# Patient Record
Sex: MALE | Race: OTHER | URBAN - METROPOLITAN AREA
[De-identification: names, ages, dates, MRNs, and addresses within clinical notes are randomized per-mention and may not be internally consistent; named-entity substitution may affect disease eponyms.]

---
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
## 2023-03-03 NOTE — ED PEDIATRIC NURSE NOTE - NS ED NURSE DISCH DISPOSITION
Brow Lift Text: A midfrontal incision was made medially to the defect to allow access to the tissues just superior to the left eyebrow. Following careful dissection inferiorly in a supraperiosteal plane to the level of the left eyebrow, several 3-0 monocryl sutures were used to resuspend the eyebrow orbicularis oculi muscular unit to the superior frontal bone periosteum. This resulted in an appropriate reapproximation of static eyebrow symmetry and correction of the left brow ptosis. Discharged

## 2023-03-27 ENCOUNTER — EMERGENCY (EMERGENCY)
Facility: HOSPITAL | Age: 18
LOS: 1 days | Discharge: HOME | End: 2023-03-27
Payer: MEDICAID

## 2023-03-27 DIAGNOSIS — J02.9 ACUTE PHARYNGITIS, UNSPECIFIED: ICD-10-CM

## 2023-03-27 DIAGNOSIS — R07.89 OTHER CHEST PAIN: ICD-10-CM

## 2023-03-27 PROCEDURE — 71046 X-RAY EXAM CHEST 2 VIEWS: CPT | Mod: 26

## 2023-03-27 PROCEDURE — 99283 EMERGENCY DEPT VISIT LOW MDM: CPT | Mod: 25

## 2023-03-27 PROCEDURE — 93010 ELECTROCARDIOGRAM REPORT: CPT

## 2023-03-27 PROCEDURE — 99284 EMERGENCY DEPT VISIT MOD MDM: CPT

## 2023-03-27 PROCEDURE — 93005 ELECTROCARDIOGRAM TRACING: CPT

## 2023-03-27 PROCEDURE — 71046 X-RAY EXAM CHEST 2 VIEWS: CPT

## 2023-08-16 ENCOUNTER — EMERGENCY (EMERGENCY)
Facility: HOSPITAL | Age: 18
LOS: 0 days | Discharge: HOME | End: 2023-08-17
Attending: EMERGENCY MEDICINE
Payer: MEDICAID

## 2023-08-16 VITALS
HEART RATE: 67 BPM | RESPIRATION RATE: 18 BRPM | WEIGHT: 119.93 LBS | DIASTOLIC BLOOD PRESSURE: 79 MMHG | HEIGHT: 68 IN | TEMPERATURE: 98 F | OXYGEN SATURATION: 99 % | SYSTOLIC BLOOD PRESSURE: 112 MMHG

## 2023-08-16 DIAGNOSIS — R20.8 OTHER DISTURBANCES OF SKIN SENSATION: ICD-10-CM

## 2023-08-16 DIAGNOSIS — L29.9 PRURITUS, UNSPECIFIED: ICD-10-CM

## 2023-08-16 DIAGNOSIS — R23.8 OTHER SKIN CHANGES: ICD-10-CM

## 2023-08-16 PROCEDURE — 82962 GLUCOSE BLOOD TEST: CPT

## 2023-08-16 PROCEDURE — 99282 EMERGENCY DEPT VISIT SF MDM: CPT

## 2023-08-16 PROCEDURE — 99284 EMERGENCY DEPT VISIT MOD MDM: CPT | Mod: 25

## 2023-08-16 NOTE — ED ADULT TRIAGE NOTE - CHIEF COMPLAINT QUOTE
Pt ambulatory to ED c/o "irritated skin". Pt states he has dry eyes, dry throat. Pt denies itching or burning skin but says "irritated like allergies". No visible rash noted. Pt in no respiratory distress. Pt took zyrtec earlier today. Pt AO4 and denies PMH and has NKDA.

## 2023-08-17 LAB — GLUCOSE BLDC GLUCOMTR-MCNC: 94 MG/DL — SIGNIFICANT CHANGE UP (ref 70–99)

## 2023-08-17 RX ORDER — HYDROCORTISONE 1 %
1 OINTMENT (GRAM) TOPICAL
Qty: 3.5 | Refills: 0
Start: 2023-08-17 | End: 2023-08-23

## 2023-08-17 NOTE — ED STATDOCS - NSFOLLOWUPINSTRUCTIONS_ED_ALL_ED_FT
Hydrocortisone sent to pharmacy, use as directed.    Can continue over the counter antihistamine if you have itching.    Follow up with your PCP.    Return to the ED for any worsening.

## 2023-08-17 NOTE — ED STATDOCS - PATIENT PORTAL LINK FT
You can access the FollowMyHealth Patient Portal offered by [facility] by registering at the following website: [email or web address]. By joining [email or web address]’s FollowMyHealth portal, you will also be able to view your health information using other applications (apps) compatible with our system.

## 2023-08-17 NOTE — ED STATDOCS - OBJECTIVE STATEMENT
16 yo M no significant PMHx presents with CC of irritated skin.  States symptoms started this morning.  States skin on arms, neck, upper back feel itchy, dry.  States his mouth and eyes also feel dry. He took allergy medication this morning without improvement.  No other concerns.

## 2024-03-04 ENCOUNTER — APPOINTMENT (OUTPATIENT)
Dept: OPHTHALMOLOGY | Facility: CLINIC | Age: 19
End: 2024-03-04

## 2024-03-29 ENCOUNTER — APPOINTMENT (OUTPATIENT)
Dept: OPHTHALMOLOGY | Facility: CLINIC | Age: 19
End: 2024-03-29
Payer: SELF-PAY

## 2024-03-29 ENCOUNTER — NON-APPOINTMENT (OUTPATIENT)
Age: 19
End: 2024-03-29

## 2024-03-29 PROCEDURE — 99204 OFFICE O/P NEW MOD 45 MIN: CPT

## 2024-03-29 PROCEDURE — 92083 EXTENDED VISUAL FIELD XM: CPT

## 2024-06-27 ENCOUNTER — EMERGENCY (EMERGENCY)
Facility: HOSPITAL | Age: 19
LOS: 0 days | Discharge: HOME | End: 2024-06-27
Attending: EMERGENCY MEDICINE
Payer: MEDICAID

## 2024-06-27 VITALS
RESPIRATION RATE: 14 BRPM | WEIGHT: 147.93 LBS | TEMPERATURE: 98 F | SYSTOLIC BLOOD PRESSURE: 128 MMHG | HEART RATE: 81 BPM | OXYGEN SATURATION: 99 % | DIASTOLIC BLOOD PRESSURE: 78 MMHG

## 2024-06-27 DIAGNOSIS — S39.012A STRAIN OF MUSCLE, FASCIA AND TENDON OF LOWER BACK, INITIAL ENCOUNTER: ICD-10-CM
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
## 2024-06-27 DIAGNOSIS — M54.50 LOW BACK PAIN, UNSPECIFIED: ICD-10-CM
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
## 2024-06-27 PROCEDURE — 99283 EMERGENCY DEPT VISIT LOW MDM: CPT

## 2024-06-27 PROCEDURE — 99284 EMERGENCY DEPT VISIT MOD MDM: CPT

## 2024-06-27 RX ORDER — IBUPROFEN 200 MG
1 TABLET ORAL
Qty: 20 | Refills: 0
Start: 2024-06-27 | End: 2024-07-01

## 2024-06-27 RX ORDER — CYCLOBENZAPRINE HYDROCHLORIDE 10 MG/1
1 TABLET, FILM COATED ORAL
Qty: 15 | Refills: 0
Start: 2024-06-27 | End: 2024-07-01

## 2024-06-27 NOTE — ED STATDOCS - PATIENT PORTAL LINK FT
You can access the FollowMyHealth Patient Portal offered by [facility] by registering at the following website: [email or web address]. By joining [employer]’s FollowMyHealth portal, you will also be able to view your health information using other applications (apps) compatible with our system.

## 2024-06-27 NOTE — ED STATDOCS - PROGRESS NOTE DETAILS
18-year-old male presents emergency department complaining of left lower back pain for the past day.  Pain worsens with bending over.  Denies trauma or injury.  Denies fevers chills chest pain shortness of breath abdominal pain nausea/vomiting/diarrhea extremity numbness/weakness/tingling/bladder incontinence dysuria hematuria saddle anesthesia.  Vital stable.  Physical exam demonstrates no reproducible tenderness to palpation, no bony midline tenderness, pain with lumbar flexion, rest of exam unremarkable.  Likely lumbar strain, will DC with anti-inflammatories and muscle relaxer.  She return precautions were discussed.  All questions and concerns were addressed. - Deana Rousseau PA-C

## 2024-06-27 NOTE — ED ADULT NURSE NOTE - DISCHARGE DATE/TIME
27-Jun-2024 15:06 Dutasteride Counseling: Dustasteride Counseling:  I discussed with the patient the risks of use of dutasteride including but not limited to decreased libido, decreased ejaculate volume, and gynecomastia. Women who can become pregnant should not handle medication.  All of the patient's questions and concerns were addressed. Dutasteride Male Counseling: Dustasteride Counseling:  I discussed with the patient the risks of use of dutasteride including but not limited to decreased libido, decreased ejaculate volume, and gynecomastia. Women who can become pregnant should not handle medication.  All of the patient's questions and concerns were addressed.

## 2024-06-27 NOTE — ED STATDOCS - NSFOLLOWUPINSTRUCTIONS_ED_ALL_ED_FT
Take Ibuprofen every 6 hours as needed for pain. Take muscle relaxer every 8 hours as needed. Return to the ED for new or worsening symptoms.    Lumbar Strain  A lumbar strain, which is sometimes called a low-back strain, is a stretch or tear in a muscle or tendons in the lower back (lumbar spine). Tendons are the strong cords of tissue that attach muscle to bone. This type of injury occurs when muscles or tendons are torn or are stretched beyond their limits.    Lumbar strains can range from mild to severe. Mild strains may involve stretching a muscle or tendon without tearing it. These may heal in 1–2 weeks. More severe strains involve tearing of muscle fibers or tendons. These will cause more pain and may take 6–8 weeks to heal.    What are the causes?  This condition may be caused by:  Trauma, such as a fall or a hit to the body.  Twisting or overstretching the back. This may result from doing activities that take a lot of energy, such as lifting heavy objects.  What increases the risk?  A lumbar strain is more common in:  Athletes.  People with obesity.  People who do repeated lifting, bending, or other movements that involve their back.  What are the signs or symptoms?  Symptoms of this condition may include:  Sharp or dull pain in the lower back that does not go away. The pain may spread to the buttocks.  Stiffness or limited range of motion.  Sudden muscle tightening (spasms).  How is this diagnosed?  This condition may be diagnosed based on:  Your symptoms.  Your medical history.  A physical exam.  Imaging tests, such as:  X-rays.  MRI.  How is this treated?  Treatment for this condition may include:  Rest.  Applying heat and cold to the affected area.  Over-the-counter medicines to help with pain and inflammation, such as NSAIDs.  Prescription medicine for pain or to relax the muscles. These may be needed for a short time.  Physical therapy exercises to improve movement and strength.  Follow these instructions at home:  Managing pain, stiffness, and swelling    Bag of ice on a towel on the skin.  A heating pad for use on the affected area.  If told, put ice on the injured area during the first 24 hours after your injury.  Put ice in a plastic bag.  Place a towel between your skin and the bag.  Leave the ice on for 20 minutes, 2–3 times a day.  If told, apply heat to the affected area as often as told by your health care provider. Use the heat source that your health care provider recommends, such as a moist heat pack or a heating pad.  Place a towel between your skin and the heat source.  Leave the heat on for 20–30 minutes.  Remove the heat if your skin turns bright red. This is especially important if you are unable to feel pain, heat, or cold. You have a greater risk of getting burned.  If your skin turns bright red, remove the ice or heat right away to prevent skin damage. The risk of damage is higher if you cannot feel pain, heat, or cold.  Activity    Rest and return to your normal activities as told by your health care provider. Ask your health care provider what activities are safe for you.  Do exercises as told by your health care provider.  General instructions    Take over-the-counter and prescription medicines only as told by your health care provider.  Ask your health care provider if the medicine prescribed to you:  Requires you to avoid driving or using machinery.  Can cause constipation. You may need to take these actions to prevent or treat constipation:  Drink enough fluid to keep your urine pale yellow.  Take over-the-counter or prescription medicines.  Eat foods that are high in fiber, such as beans, whole grains, and fresh fruits and vegetables.  Limit foods that are high in fat and processed sugars, such as fried or sweet foods.  Do not use any products that contain nicotine or tobacco. These products include cigarettes, chewing tobacco, and vaping devices, such as e-cigarettes. If you need help quitting, ask your health care provider.  How is this prevented?  A person showing how to lift a heavy object. The correct way shows the person's knees bent.  To prevent a future low-back injury:  Always warm up properly before physical activity or sports.  Cool down and stretch after being active.  Use correct form when playing sports and lifting heavy objects. Bend your knees before you lift heavy objects.  Use good posture when sitting and standing.  Stay physically fit and keep a healthy weight.  Do at least 150 minutes of moderate intensity exercise each week, such as brisk walking or water aerobics.  Do strength exercises at least 2 times each week.  Contact a health care provider if:  Your back pain does not improve after several weeks of treatment.  Your symptoms get worse.  You have a fever.  Get help right away if:  Your back pain is severe.  You are unable to stand or walk.  You develop pain in your legs.  You have weakness in your buttocks or legs.  You have trouble controlling when you urinate or when you have a bowel movement.  You have frequent, painful, or bloody urination.  This information is not intended to replace advice given to you by your health care provider. Make sure you discuss any questions you have with your health care provider.

## 2024-06-27 NOTE — ED ADULT TRIAGE NOTE - CHIEF COMPLAINT QUOTE
woke up yesterday with mid lower back pain. worse with bending movement. Denies fall or injury. No pain medication taken at home. Denies urinary sx, fever/chills, n/v/d.

## 2024-06-27 NOTE — ED STATDOCS - OBJECTIVE STATEMENT
19 y/o male presents to the ED c/o lower back pain x1 day. Pain exacerbated with bending down. NKDA. No other complaints at this time.